# Patient Record
Sex: MALE | Race: WHITE | NOT HISPANIC OR LATINO
[De-identification: names, ages, dates, MRNs, and addresses within clinical notes are randomized per-mention and may not be internally consistent; named-entity substitution may affect disease eponyms.]

---

## 2021-01-27 ENCOUNTER — APPOINTMENT (OUTPATIENT)
Dept: SURGERY | Facility: CLINIC | Age: 66
End: 2021-01-27

## 2021-01-27 ENCOUNTER — TRANSCRIPTION ENCOUNTER (OUTPATIENT)
Age: 66
End: 2021-01-27

## 2021-01-27 PROBLEM — Z00.00 ENCOUNTER FOR PREVENTIVE HEALTH EXAMINATION: Status: ACTIVE | Noted: 2021-01-27

## 2021-02-03 ENCOUNTER — APPOINTMENT (OUTPATIENT)
Dept: SURGERY | Facility: CLINIC | Age: 66
End: 2021-02-03
Payer: MEDICARE

## 2021-02-03 DIAGNOSIS — Z78.9 OTHER SPECIFIED HEALTH STATUS: ICD-10-CM

## 2021-02-03 DIAGNOSIS — Z83.79 FAMILY HISTORY OF OTHER DISEASES OF THE DIGESTIVE SYSTEM: ICD-10-CM

## 2021-02-03 DIAGNOSIS — Z83.3 FAMILY HISTORY OF DIABETES MELLITUS: ICD-10-CM

## 2021-02-03 DIAGNOSIS — I10 ESSENTIAL (PRIMARY) HYPERTENSION: ICD-10-CM

## 2021-02-03 PROCEDURE — 99205 OFFICE O/P NEW HI 60 MIN: CPT

## 2021-02-03 NOTE — PLAN
[FreeTextEntry1] : Mildly symptomatic but progressive and now incarcerated incisional hernia.  \par \par Given the associated discomfort, limitation in recent activity, progression in size over time, his understandable anxiety and the risk of further progression in size or strangulation, at least consideration of operative intervention seems indicated and appropriate.  The risks, benefits and nature of the operative intervention have been reviewed at length, including but not limited to the possibility of bowel injury or incidental enterotomy, the probability of utilization of a permanent mesh, the possibility of infection which could require the future removal or replacement of the mesh, the possibility of future hernia recurrence, the approximate size and location of the operative incision, the likelihood of a visually noticeable “scar”, and the possibility of residual abdominal wall deformity.  Mr. Loredo is aware that all abdominal surgery maintains the possibilities of future abdominal wall reherniation, future intra-abdominal adhesions and increased risk during future operative interventions.  Mr. Loredo is aware that medical complications unrelated to the specific operative procedure such as cardiopulmonary issues, deep venous thrombosis/ pulmonary embolism and urinary retention are possible.  We have discussed that general anesthesia will be required and appropriate care may allow for outpatient treatment or require inpatient care.  Mr. Loredo demonstrates good understanding and remains eager to proceed.  While he has no further specific concerns presently, I have encouraged him to follow-up with me and my office at any time in the interim to discuss any future questions.  He understands that any operative intervention will follow standard pre-surgical testing and medical clearance as required by the appropriate facility.  Please note that more than 60 minutes was spent in overall face to face time with greater than 50% in education, counseling and overall coordination of care.  In light of the incarcerated nature of the hernia and the concern for possible multiple defects, a CT scan is in order to assess the anatomy and assist in operative planning.  Mr. Loredo tells me that this is already pending under the direction of his Urologist (though he himself is unaware of the urologic indication for that study.)  I remain available.  Further recommendations and plans to follow the receipt and review of that study... \par \par \par \par

## 2021-02-03 NOTE — PHYSICAL EXAM
[Respiratory Effort] : normal respiratory effort [Normal Rate and Rhythm] : normal rate and rhythm [No HSM] : no hepatosplenomegaly [Tender] : was nontender [No Rash or Lesion] : No rash or lesion [Alert] : alert [Oriented to Person] : oriented to person [Oriented to Place] : oriented to place [Oriented to Time] : oriented to time [Anxious] : anxious [de-identified] : Appears well, no acute distress, ambulates easily into office and assumes examination table without need of assistance. [de-identified] : Normocephalic and atraumatic. [de-identified] : Supple with full range of motion. [FreeTextEntry1] : No cervical, supraclavicular, axillary or inguinal adenopathy. [de-identified] : No visible lesions or palpable masses. 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  Questionable multiple lobes or adjacent fascial defects.\par However, incompletely reducible due to local discomfort.\par No overlying or surrounding inflammatory changes to suggest strangulation.\par Bilateral groins intact throughout inguinal, femoral and Spigelian spaces despite cough and Valsalva in both upright and supine positions. [de-identified] : Grossly normal external genitalia. [de-identified] : Deferred. [de-identified] : Grossly symmetric and within normal limits without any obvious motor or sensory deficits. [de-identified] : though appropriately so...

## 2021-02-03 NOTE — HISTORY OF PRESENT ILLNESS
[de-identified] : Very pleasant gentleman presenting under direction of his referring physician for consultation regarding a previously diagnosed incisional hernia at the umbilicus.\par He reports being aware of a palpable fullness at the site of concern shortly after her cholecystectomy some 20 years ago.\par The fullness was first just palpable, but then became grossly visible.\par He reports that it is no longer reducible.\par While he denies emily pain, he admits to sensations of "awareness" or pressure and even sometimes discomfort with prolonged periods of standing or more vigorous exertion...

## 2021-02-03 NOTE — REVIEW OF SYSTEMS
[Feeling Poorly] : not feeling poorly [Feeling Tired] : feeling tired [As Noted in HPI] : as noted in HPI [Anxiety] : anxiety [Depression] : no depression [Negative] : Heme/Lymph [de-identified] : regarding this issue and visit...

## 2021-04-07 ENCOUNTER — APPOINTMENT (OUTPATIENT)
Dept: CT IMAGING | Facility: CLINIC | Age: 66
End: 2021-04-07
Payer: MEDICARE

## 2021-04-07 ENCOUNTER — OUTPATIENT (OUTPATIENT)
Dept: OUTPATIENT SERVICES | Facility: HOSPITAL | Age: 66
LOS: 1 days | End: 2021-04-07
Payer: COMMERCIAL

## 2021-04-07 DIAGNOSIS — Z00.8 ENCOUNTER FOR OTHER GENERAL EXAMINATION: ICD-10-CM

## 2021-04-07 PROCEDURE — 82565 ASSAY OF CREATININE: CPT

## 2021-04-07 PROCEDURE — 75574 CT ANGIO HRT W/3D IMAGE: CPT | Mod: 26,MH

## 2021-04-07 PROCEDURE — 75574 CT ANGIO HRT W/3D IMAGE: CPT

## 2021-12-01 ENCOUNTER — APPOINTMENT (OUTPATIENT)
Dept: SURGERY | Facility: CLINIC | Age: 66
End: 2021-12-01
Payer: COMMERCIAL

## 2021-12-01 VITALS
HEART RATE: 82 BPM | HEIGHT: 71 IN | DIASTOLIC BLOOD PRESSURE: 70 MMHG | BODY MASS INDEX: 32.5 KG/M2 | OXYGEN SATURATION: 97 % | TEMPERATURE: 96.9 F | SYSTOLIC BLOOD PRESSURE: 130 MMHG | WEIGHT: 232.14 LBS

## 2021-12-01 DIAGNOSIS — K43.0 INCISIONAL HERNIA WITH OBSTRUCTION, W/OUT GANGRENE: ICD-10-CM

## 2021-12-01 DIAGNOSIS — Z92.29 PERSONAL HISTORY OF OTHER DRUG THERAPY: ICD-10-CM

## 2021-12-01 PROCEDURE — 99215 OFFICE O/P EST HI 40 MIN: CPT

## 2021-12-01 NOTE — PLAN
[FreeTextEntry1] : Persistently symptomatic and incarcerated incisional hernia with multiple adjacent defects.  \par \par Given the associated discomfort with limitation in activity, progression over time, his understandable anxiety with the risk of further progression in size or strangulation, operative intervention seems indicated and appropriate.  \par \par The risks, benefits and nature of the operative intervention have been reviewed at length, including but not limited to the possibility of bowel injury or incidental enterotomy, the plan for utilization of a permanent mesh, the possibility of infection which could require the future removal or replacement of the mesh, the possibility of future hernia recurrences, the approximate size and location of the operative incisions, the likelihood of visually noticeable “scars”, and the possibility of residual abdominal wall deformity.  Mr. Loredo is aware that all abdominal surgery maintains the possibilities of future abdominal wall reherniation, future intra-abdominal adhesions and increased risk during future operative interventions.  Mr. Loredo is aware that medical complications unrelated to the specific operative procedure such as cardiopulmonary issues, deep venous thrombosis/ pulmonary embolism and urinary retention are possible.  We have discussed that general anesthesia with intubation will be required and that appropriate care may allow for outpatient treatment or require a transition to inpatient care.  \par \par Mr. Loredo demonstrates good understanding and remains eager to proceed.  While he has no further specific concerns presently, I have encouraged him to follow-up with me and my office at any time in the interim to discuss any future questions.  \par \par He tells me that he has had no further hematuria and that his Urologist has made no further recommendations after his review of CT.  Mr. Loredo understands that this laparoscopic, possible open operative intervention can follow standard pre-surgical testing and medical clearance as required by the appropriate facility.  \par \par Please note that more than 50% of face to face time was spent in counseling and coordination of care.

## 2021-12-01 NOTE — DATA REVIEWED
[FreeTextEntry1] : CT scan of abdomen and pelvis\par 2/5/2021\par @ [facility]:\par \par "two abutting fat-containing midline ventral wall hernias identified, located at and just below the level of the umbilicus.  These hernia sacs only contains mesenteric fat within them."\par \par

## 2021-12-01 NOTE — REVIEW OF SYSTEMS
[Feeling Poorly] : not feeling poorly [Feeling Tired] : not feeling tired [As Noted in HPI] : as noted in HPI [Anxiety] : anxiety [Depression] : no depression [Negative] : Heme/Lymph [de-identified] : regarding this issue and visit, though less so...

## 2021-12-01 NOTE — PHYSICAL EXAM
[Respiratory Effort] : normal respiratory effort [Normal Rate and Rhythm] : normal rate and rhythm [No HSM] : no hepatosplenomegaly [Tender] : was nontender [No Rash or Lesion] : No rash or lesion [Alert] : alert [Oriented to Person] : oriented to person [Oriented to Place] : oriented to place [Oriented to Time] : oriented to time [Anxious] : anxious [de-identified] : Appears well, no acute distress, ambulates easily into the office. [de-identified] : Remains normocephalic and atraumatic. [de-identified] : Still supple with full range of motion. [FreeTextEntry1] : No cervical, supraclavicular, axillary or inguinal adenopathy today. [de-identified] : No visible lesion or palpable mass. 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  \par Multiple lobes or adjacent fascial defects suggested by exam and supported by CT imaging.\par Incompletely reducible once again, but only minimally tender.\par No overlying or surrounding inflammatory changes to suggest strangulation- stable.\par Bilateral groins intact throughout inguinal, femoral and Spigelian spaces despite cough and Valsalva in both upright and supine varied positions. [de-identified] : Grossly normal external genitalia. [de-identified] : Deferred. [de-identified] : Grossly symmetric and within normal limits without motor or sensory deficits. [de-identified] : though less so...

## 2021-12-01 NOTE — HISTORY OF PRESENT ILLNESS
[de-identified] : Very pleasant gentleman presenting under direction of his referring physician for consultation regarding a previously diagnosed incisional hernia at the umbilicus.\par He reports being aware of a palpable fullness at the site of concern shortly after her cholecystectomy some 20 years ago.\par The fullness was first just palpable, but then became grossly visible.\par He reports that it is no longer reducible.\par While he denies emily pain, he admits to sensations of "awareness" or pressure and even sometimes discomfort with prolonged periods of standing or more vigorous exertion... [de-identified] : Very pleasant gentleman presenting for ongoing follow-up regarding a previously diagnosed incisional hernia at the umbilicus.\par He reports being aware of a palpable fullness shortly after her cholecystectomy some 20 years ago.\par The fullness was first just palpable, but then became grossly visible and has now progressed and is no longer reducible.\par While he continues to deny emily pain, he admits to sensations of awareness and pressure with discomfort with prolonged standing or more vigorous exertion.\par Since our last visit he has undergone a CT under the direction of his Urologist...

## 2022-02-08 ENCOUNTER — APPOINTMENT (OUTPATIENT)
Dept: OTOLARYNGOLOGY | Facility: CLINIC | Age: 67
End: 2022-02-08

## 2022-03-03 ENCOUNTER — APPOINTMENT (OUTPATIENT)
Dept: OPHTHALMOLOGY | Facility: CLINIC | Age: 67
End: 2022-03-03

## 2022-09-01 ENCOUNTER — APPOINTMENT (OUTPATIENT)
Dept: OPHTHALMOLOGY | Facility: CLINIC | Age: 67
End: 2022-09-01

## 2022-11-18 ENCOUNTER — APPOINTMENT (OUTPATIENT)
Dept: OPHTHALMOLOGY | Facility: CLINIC | Age: 67
End: 2022-11-18

## 2025-09-09 ENCOUNTER — NON-APPOINTMENT (OUTPATIENT)
Age: 70
End: 2025-09-09

## 2025-09-11 ENCOUNTER — APPOINTMENT (OUTPATIENT)
Dept: OTOLARYNGOLOGY | Facility: CLINIC | Age: 70
End: 2025-09-11